# Patient Record
Sex: MALE | Race: WHITE | NOT HISPANIC OR LATINO | Employment: FULL TIME | URBAN - METROPOLITAN AREA
[De-identification: names, ages, dates, MRNs, and addresses within clinical notes are randomized per-mention and may not be internally consistent; named-entity substitution may affect disease eponyms.]

---

## 2023-12-18 ENCOUNTER — APPOINTMENT (EMERGENCY)
Dept: VASCULAR ULTRASOUND | Facility: HOSPITAL | Age: 48
End: 2023-12-18
Payer: COMMERCIAL

## 2023-12-18 ENCOUNTER — HOSPITAL ENCOUNTER (EMERGENCY)
Facility: HOSPITAL | Age: 48
Discharge: HOME | End: 2023-12-18
Attending: EMERGENCY MEDICINE
Payer: COMMERCIAL

## 2023-12-18 VITALS
DIASTOLIC BLOOD PRESSURE: 86 MMHG | TEMPERATURE: 98.1 F | OXYGEN SATURATION: 98 % | HEART RATE: 75 BPM | RESPIRATION RATE: 18 BRPM | WEIGHT: 220 LBS | SYSTOLIC BLOOD PRESSURE: 158 MMHG

## 2023-12-18 DIAGNOSIS — I80.02 THROMBOPHLEBITIS OF SUPERFICIAL VEINS OF LEFT LOWER EXTREMITY: Primary | ICD-10-CM

## 2023-12-18 PROCEDURE — 99283 EMERGENCY DEPT VISIT LOW MDM: CPT

## 2023-12-18 PROCEDURE — 93971 EXTREMITY STUDY: CPT

## 2023-12-18 PROCEDURE — 99284 EMERGENCY DEPT VISIT MOD MDM: CPT | Performed by: PHYSICIAN ASSISTANT

## 2023-12-18 NOTE — DISCHARGE INSTRUCTIONS
Take ibuprofen 400mg to 600mg every 6 hours as needed for pain. Apply heating pad to affected area.    Please follow-up with your family doctor. Return to the ER with any worsening symptoms or fevers.

## 2023-12-18 NOTE — ED PROVIDER NOTES
History  Chief Complaint   Patient presents with    Leg Pain     R lower leg pain, states that his varicose veins hurt      49yo male with a history of seasonal allergies and varicose veins presenting for evaluation of right calf pain x 2 days. Patient has a longstanding history of varicose veins which do not typically bother him. He started having redness and pain to his medial right calf 2 days ago. He denies any injuries. Pain seems to improve with walking and activity. He has tried Advil with some improvement. He was seen at urgent care and told to go to the ED for an ultrasound. He denies any fevers, chest pain, shortness of breath.       History provided by:  Patient   used: No    Leg Pain  Associated symptoms: no fever and no neck pain        None       No past medical history on file.    No past surgical history on file.    No family history on file.  I have reviewed and agree with the history as documented.    No existing history information found.  No existing history information found.       Review of Systems   Constitutional:  Negative for chills and fever.   HENT:  Negative for drooling and voice change.    Eyes:  Negative for discharge and redness.   Respiratory:  Negative for shortness of breath and stridor.    Cardiovascular:  Positive for leg swelling. Negative for chest pain.   Gastrointestinal:  Negative for abdominal pain and vomiting.   Musculoskeletal:  Positive for myalgias. Negative for neck pain and neck stiffness.   Skin:  Positive for color change. Negative for rash.   Neurological:  Negative for seizures and syncope.   Psychiatric/Behavioral:  Negative for confusion. The patient is not nervous/anxious.    All other systems reviewed and are negative.      Physical Exam  Physical Exam  Vitals and nursing note reviewed.   Constitutional:       General: He is not in acute distress.     Appearance: Normal appearance. He is not toxic-appearing.   HENT:      Head:  Normocephalic and atraumatic.      Right Ear: External ear normal.      Left Ear: External ear normal.   Eyes:      General: No scleral icterus.        Right eye: No discharge.         Left eye: No discharge.      Conjunctiva/sclera: Conjunctivae normal.   Cardiovascular:      Rate and Rhythm: Normal rate.   Pulmonary:      Effort: Pulmonary effort is normal. No respiratory distress.      Breath sounds: No stridor.   Musculoskeletal:         General: No deformity. Normal range of motion.      Cervical back: Normal range of motion.      Right lower leg: Tenderness present. No edema.      Left lower leg: No edema.      Comments: Right lower leg: There is a localized area of erythema, warmth, tenderness, and induration to the medial calf. No fluctuance. Varicosities noted. No pitting edema present. 2+ DP pulse and sensation intact.     Skin:     General: Skin is warm and dry.   Neurological:      General: No focal deficit present.      Mental Status: He is alert. Mental status is at baseline.   Psychiatric:         Mood and Affect: Mood normal.         Behavior: Behavior normal.         Vital Signs  ED Triage Vitals [12/18/23 1215]   Temperature Pulse Respirations Blood Pressure SpO2   98.1 °F (36.7 °C) 75 18 158/86 98 %      Temp Source Heart Rate Source Patient Position - Orthostatic VS BP Location FiO2 (%)   Oral -- -- -- --      Pain Score       --           Vitals:    12/18/23 1215   BP: 158/86   Pulse: 75         Visual Acuity      ED Medications  Medications - No data to display    Diagnostic Studies  Results Reviewed       None                   VAS lower limb venous duplex study, unilateral/limited    (Results Pending)              Procedures  Procedures         ED Course  ED Course as of 12/18/23 1809   Mon Dec 18, 2023   1409 Informed by vascular tech that venous duplex shows superficial thrombophlebitis in the varicose veins.                  SBIRT 22yo+      Flowsheet Row Most Recent Value   Initial  Alcohol Screen: US AUDIT-C     1. How often do you have a drink containing alcohol? 0 Filed at: 12/18/2023 1216   2. How many drinks containing alcohol do you have on a typical day you are drinking?  0 Filed at: 12/18/2023 1216   3a. Male UNDER 65: How often do you have five or more drinks on one occasion? 0 Filed at: 12/18/2023 1216   3b. FEMALE Any Age, or MALE 65+: How often do you have 4 or more drinks on one occassion? 0 Filed at: 12/18/2023 1216   Audit-C Score 0 Filed at: 12/18/2023 1216   HOANG: How many times in the past year have you...    Used an illegal drug or used a prescription medication for non-medical reasons? Never Filed at: 12/18/2023 1216                      Medical Decision Making  48yoM presenting for R calf pain x 2 days. Hx of varicose veins. No trauma. No CP/SOB. He is well appearing with stable vitals. There is a localized area of erythema and tenderness to the medial calf. No pitting edema. RLE is neurovascularly intact.    Venous duplex obtained which shows superficial thrombophlebitis in the varicose veins of the right calf. No DVT present. Supportive care discussed including heat and PRN NSAIDs. Advised close PCP follow-up. ED return precautions discussed. Patient expressed understanding and is agreeable to plan. Patient discharged in stable condition.        Problems Addressed:  Thrombophlebitis of superficial veins of left lower extremity: acute illness or injury             Disposition  Final diagnoses:   Thrombophlebitis of superficial veins of left lower extremity     Time reflects when diagnosis was documented in both MDM as applicable and the Disposition within this note       Time User Action Codes Description Comment    12/18/2023  2:20 PM Sudha Lawton Add [I82.811] Superficial thrombosis of lower extremity, right     12/18/2023  2:20 PM Sudha Lawton Add [I80.02] Thrombophlebitis of superficial veins of left lower extremity     12/18/2023  2:21 PM Sudha Lawton  Modify [I80.02] Thrombophlebitis of superficial veins of left lower extremity     12/18/2023  2:21 PM Sudha Lawton Remove [I82.811] Superficial thrombosis of lower extremity, right           ED Disposition       ED Disposition   Discharge    Condition   Stable    Date/Time   Mon Dec 18, 2023 1420    Comment   Duc Kapoor discharge to home/self care.                   Follow-up Information       Follow up With Specialties Details Why Contact Info Additional Information    [facility] Lee  Schedule an appointment as soon as possible for a visit   [address] [place] [address] [place], [place] [id number] [place], [place] [id number]  [phone]       [facility] Emergency Medicine  If symptoms worsen [address] [facility] [id number] [phone] [facility], [address] [place], [place], [address]            There are no discharge medications for this patient.      No discharge procedures on file.    PDMP Review       None            ED Provider  Electronically Signed by             Sudha Lawton PA-C  12/18/23 4236

## 2023-12-19 PROCEDURE — 93971 EXTREMITY STUDY: CPT | Performed by: SURGERY
